# Patient Record
Sex: FEMALE | Race: WHITE | NOT HISPANIC OR LATINO
[De-identification: names, ages, dates, MRNs, and addresses within clinical notes are randomized per-mention and may not be internally consistent; named-entity substitution may affect disease eponyms.]

---

## 2017-02-20 ENCOUNTER — RX ONLY (RX ONLY)
Age: 62
End: 2017-02-20

## 2017-02-20 RX ORDER — BETAMETHASONE VALERATE 1 MG/ML
LOTION TOPICAL
Qty: 60 | Refills: 0 | Status: DISCONTINUED
Start: 2017-02-20 | End: 2017-02-22

## 2017-02-22 ENCOUNTER — RX ONLY (RX ONLY)
Age: 62
End: 2017-02-22

## 2017-02-22 RX ORDER — BETAMETHASONE VALERATE 1 MG/ML
LOTION TOPICAL
Qty: 60 | Refills: 0 | Status: DISCONTINUED
Start: 2017-02-22 | End: 2017-03-23

## 2017-03-23 ENCOUNTER — OTHER- (OUTPATIENT)
Dept: URBAN - METROPOLITAN AREA CLINIC 15 | Facility: CLINIC | Age: 62
Setting detail: DERMATOLOGY
End: 2017-03-23

## 2017-03-23 DIAGNOSIS — D22.5 MELANOCYTIC NEVI OF TRUNK: ICD-10-CM

## 2017-03-23 DIAGNOSIS — L21.8 OTHER SEBORRHEIC DERMATITIS: ICD-10-CM

## 2017-03-23 PROBLEM — D1801 228.01: Status: ACTIVE | Noted: 2017-03-23

## 2017-03-23 PROBLEM — L570 702.0: Status: ACTIVE | Noted: 2017-03-23

## 2017-03-23 PROBLEM — L930 695.4: Status: ACTIVE | Noted: 2017-03-23

## 2017-03-23 PROBLEM — L932 695.4: Status: ACTIVE | Noted: 2017-03-23

## 2017-03-23 PROCEDURE — 99213 OFFICE O/P EST LOW 20 MIN: CPT

## 2017-03-23 PROCEDURE — 17000 DESTRUCT PREMALG LESION: CPT

## 2017-03-23 RX ORDER — BETAMETHASONE VALERATE 1 MG/ML
1 APPLICATION LOTION TOPICAL AS DIRECTED
Qty: 60 | Refills: 0
Start: 2017-03-23

## 2017-10-02 ENCOUNTER — FOLLOW-UP (OUTPATIENT)
Dept: URBAN - METROPOLITAN AREA CLINIC 15 | Facility: CLINIC | Age: 62
Setting detail: DERMATOLOGY
End: 2017-10-02

## 2017-10-02 DIAGNOSIS — L91.8 OTHER HYPERTROPHIC DISORDERS OF THE SKIN: ICD-10-CM

## 2017-10-02 PROBLEM — L570 702.0: Status: ACTIVE | Noted: 2017-10-02

## 2017-10-02 PROBLEM — D1801 228.01: Status: ACTIVE | Noted: 2017-10-02

## 2017-10-02 PROBLEM — L932 695.4: Status: ACTIVE | Noted: 2017-10-02

## 2017-10-02 PROBLEM — D235 216.5: Status: ACTIVE | Noted: 2017-10-02

## 2017-10-02 PROBLEM — D485 238.2: Status: ACTIVE | Noted: 2017-10-02

## 2017-10-02 PROBLEM — L821 702.19: Status: ACTIVE | Noted: 2017-10-02

## 2017-10-02 PROBLEM — L930 695.4: Status: ACTIVE | Noted: 2017-10-02

## 2017-10-02 PROCEDURE — 99213 OFFICE O/P EST LOW 20 MIN: CPT

## 2017-10-02 PROCEDURE — 11100 BX SKIN SUBCUTANEOUS&/MUCOUS MEMBRANE 1 LESION: CPT

## 2017-10-02 PROCEDURE — 17000 DESTRUCT PREMALG LESION: CPT

## 2017-10-02 RX ORDER — BETAMETHASONE VALERATE 1 MG/ML
LOTION TOPICAL
Qty: 60 | Refills: 1 | Status: DISCONTINUED
Start: 2017-10-02 | End: 2018-02-07

## 2018-02-07 ENCOUNTER — RX ONLY (RX ONLY)
Age: 63
End: 2018-02-07

## 2018-02-07 RX ORDER — BETAMETHASONE VALERATE 1 MG/ML
1 APPLICATION LOTION TOPICAL BID
Qty: 60 | Refills: 1 | Status: DISCONTINUED
Start: 2018-02-07 | End: 2018-04-30

## 2018-04-30 ENCOUNTER — FOLLOW-UP (OUTPATIENT)
Dept: URBAN - METROPOLITAN AREA CLINIC 15 | Facility: CLINIC | Age: 63
Setting detail: DERMATOLOGY
End: 2018-04-30

## 2018-04-30 DIAGNOSIS — L72.8 OTHER FOLLICULAR CYSTS OF THE SKIN AND SUBCUTANEOUS TISSUE: ICD-10-CM

## 2018-04-30 PROBLEM — D235 216.5: Status: ACTIVE | Noted: 2018-04-30

## 2018-04-30 PROCEDURE — 99213 OFFICE O/P EST LOW 20 MIN: CPT

## 2018-04-30 RX ORDER — BETAMETHASONE VALERATE 1 MG/ML
1 APPLICATION LOTION TOPICAL BID
Qty: 60 | Refills: 1 | Status: DISCONTINUED
Start: 2018-04-30 | End: 2018-12-11

## 2018-08-22 RX ORDER — FLUOCINOLONE ACETONIDE 0.11 MG/ML
1 ML OIL TOPICAL DAILY
Qty: 118.28 | Refills: 3
Start: 2018-08-22

## 2018-08-22 RX ORDER — KETOCONAZOLE 20 MG/ML
1 APPLICATION SHAMPOO, SUSPENSION TOPICAL
Qty: 120 | Refills: 2 | Status: DISCONTINUED
Start: 2018-08-22 | End: 2019-09-25

## 2018-12-11 ENCOUNTER — RX ONLY (RX ONLY)
Age: 63
End: 2018-12-11

## 2018-12-11 RX ORDER — BETAMETHASONE VALERATE 1 MG/ML
1 APPLICATION LOTION TOPICAL BID
Qty: 60 | Refills: 1 | Status: DISCONTINUED
Start: 2018-12-11 | End: 2019-01-28

## 2019-01-28 ENCOUNTER — RX ONLY (RX ONLY)
Age: 64
End: 2019-01-28

## 2019-01-28 RX ORDER — BETAMETHASONE VALERATE 1 MG/ML
1 APPLICATION LOTION TOPICAL BID
Qty: 60 | Refills: 1
Start: 2019-01-28

## 2019-05-07 ENCOUNTER — SKIN CHECK (OUTPATIENT)
Dept: URBAN - METROPOLITAN AREA CLINIC 15 | Facility: CLINIC | Age: 64
Setting detail: DERMATOLOGY
End: 2019-05-07

## 2019-05-07 DIAGNOSIS — L30.8 OTHER SPECIFIED DERMATITIS: ICD-10-CM

## 2019-05-07 PROBLEM — L932 695.4: Status: ACTIVE | Noted: 2019-05-07

## 2019-05-07 PROBLEM — D235 216.5: Status: ACTIVE | Noted: 2019-05-07

## 2019-05-07 PROBLEM — L821 702.19: Status: ACTIVE | Noted: 2019-05-07

## 2019-05-07 PROBLEM — L930 695.4: Status: ACTIVE | Noted: 2019-05-07

## 2019-05-07 PROBLEM — D1801 228.01: Status: ACTIVE | Noted: 2019-05-07

## 2019-05-07 PROCEDURE — 11103 TANGNTL BX SKIN EA SEP/ADDL: CPT

## 2019-05-07 PROCEDURE — 11102 TANGNTL BX SKIN SINGLE LES: CPT

## 2019-05-07 PROCEDURE — 99213 OFFICE O/P EST LOW 20 MIN: CPT

## 2019-05-07 RX ORDER — MOMETASONE FUROATE 1 MG/ML
1 ML SOLUTION TOPICAL BID
Qty: 60 | Refills: 2
Start: 2019-05-07

## 2019-06-06 ENCOUNTER — OTHER- (OUTPATIENT)
Dept: URBAN - METROPOLITAN AREA CLINIC 15 | Facility: CLINIC | Age: 64
Setting detail: DERMATOLOGY
End: 2019-06-06

## 2019-06-06 DIAGNOSIS — L70.0 ACNE VULGARIS: ICD-10-CM

## 2019-06-06 PROBLEM — L570 702.0: Status: ACTIVE | Noted: 2019-06-06

## 2019-06-06 PROCEDURE — 17000 DESTRUCT PREMALG LESION: CPT

## 2020-03-12 ENCOUNTER — RX ONLY (RX ONLY)
Age: 65
End: 2020-03-12

## 2020-03-12 RX ORDER — MOMETASONE FUROATE 1 MG/ML
1 ML SOLUTION TOPICAL BID
Qty: 60 | Refills: 0
Start: 2020-03-12

## 2020-05-26 ENCOUNTER — RX ONLY (RX ONLY)
Age: 65
End: 2020-05-26

## 2020-05-26 ENCOUNTER — SKIN CHECK (OUTPATIENT)
Dept: URBAN - METROPOLITAN AREA CLINIC 15 | Facility: CLINIC | Age: 65
Setting detail: DERMATOLOGY
End: 2020-05-26

## 2020-05-26 DIAGNOSIS — L91.8 OTHER HYPERTROPHIC DISORDERS OF THE SKIN: ICD-10-CM

## 2020-05-26 PROCEDURE — 17000 DESTRUCT PREMALG LESION: CPT

## 2020-05-26 PROCEDURE — 99214 OFFICE O/P EST MOD 30 MIN: CPT

## 2020-05-26 PROCEDURE — 17003 DESTRUCT PREMALG LES 2-14: CPT

## 2020-05-26 RX ORDER — MOMETASONE FUROATE 1 MG/ML
1 ML SOLUTION TOPICAL BID
Qty: 60 | Refills: 0
Start: 2020-05-26

## 2020-05-26 RX ORDER — BETAMETHASONE VALERATE 1 MG/ML
1 APPLICATION LOTION TOPICAL BID
Qty: 60 | Refills: 5
Start: 2020-05-26

## 2020-05-26 RX ORDER — HYDROCORTISONE 25 MG/G
1 APPLICATION OINTMENT TOPICAL AS DIRECTED
Qty: 28.35 | Refills: 0
Start: 2020-05-26

## 2020-09-09 ENCOUNTER — RX ONLY (RX ONLY)
Age: 65
End: 2020-09-09

## 2020-09-09 RX ORDER — MOMETASONE FUROATE 1 MG/ML
1 ML SOLUTION TOPICAL BID
Qty: 60 | Refills: 0
Start: 2020-09-09

## 2020-11-12 ENCOUNTER — RX ONLY (RX ONLY)
Age: 65
End: 2020-11-12

## 2020-11-12 RX ORDER — MOMETASONE FUROATE 1 MG/ML
1 ML SOLUTION TOPICAL TWICE A DAY
Qty: 60 | Refills: 1
Start: 2020-11-12

## 2021-02-22 ENCOUNTER — RX ONLY (RX ONLY)
Age: 66
End: 2021-02-22

## 2021-02-22 RX ORDER — MOMETASONE FUROATE 1 MG/ML
1 A SMALL AMOUNT SOLUTION TOPICAL AT BEDTIME
Qty: 60 | Refills: 2
Start: 2021-02-22

## 2021-05-24 ENCOUNTER — COMPLETE SKIN EXAM (OUTPATIENT)
Dept: URBAN - METROPOLITAN AREA CLINIC 15 | Facility: CLINIC | Age: 66
Setting detail: DERMATOLOGY
End: 2021-05-24

## 2021-05-24 DIAGNOSIS — L82.0 INFLAMED SEBORRHEIC KERATOSIS: ICD-10-CM

## 2021-05-24 PROCEDURE — 17000 DESTRUCT PREMALG LESION: CPT

## 2021-05-24 PROCEDURE — 17003 DESTRUCT PREMALG LES 2-14: CPT

## 2021-05-24 PROCEDURE — 99213 OFFICE O/P EST LOW 20 MIN: CPT

## 2021-05-24 PROCEDURE — 11102 TANGNTL BX SKIN SINGLE LES: CPT

## 2021-05-24 RX ORDER — BETAMETHASONE VALERATE 1 MG/ML
1 AS DIRECTED LOTION TOPICAL TWICE A DAY
Qty: 60 | Refills: 6
Start: 2021-05-24

## 2021-07-01 ENCOUNTER — RX ONLY (RX ONLY)
Age: 66
End: 2021-07-01

## 2021-07-01 RX ORDER — MOMETASONE FUROATE 1 MG/ML
1 AS DIRECTED SOLUTION TOPICAL TWICE A DAY
Qty: 60 | Refills: 2
Start: 2021-07-01

## 2021-11-24 ENCOUNTER — RX ONLY (RX ONLY)
Age: 66
End: 2021-11-24

## 2021-11-24 RX ORDER — MOMETASONE FUROATE 1 MG/ML
1 AS DIRECTED SOLUTION TOPICAL TWICE A DAY
Qty: 60 | Refills: 3
Start: 2021-11-24

## 2022-06-01 ENCOUNTER — RX ONLY (RX ONLY)
Age: 67
End: 2022-06-01

## 2022-06-01 ENCOUNTER — COMPLETE SKIN EXAM (OUTPATIENT)
Dept: URBAN - METROPOLITAN AREA CLINIC 8 | Facility: CLINIC | Age: 67
Setting detail: DERMATOLOGY
End: 2022-06-01

## 2022-06-01 DIAGNOSIS — L23.9 ALLERGIC CONTACT DERMATITIS, UNSPECIFIED CAUSE: ICD-10-CM

## 2022-06-01 DIAGNOSIS — Z01.82 ENCOUNTER FOR ALLERGY TESTING: ICD-10-CM

## 2022-06-01 PROCEDURE — 99213 OFFICE O/P EST LOW 20 MIN: CPT

## 2022-06-01 RX ORDER — BETAMETHASONE VALERATE 1 MG/ML
1 AS DIRECTED LOTION TOPICAL TWICE A DAY
Qty: 60 | Refills: 6
Start: 2022-06-01

## 2022-06-23 ENCOUNTER — RX ONLY (RX ONLY)
Age: 67
End: 2022-06-23

## 2022-06-23 RX ORDER — MOMETASONE FUROATE 1 MG/ML
1 AS DIRECTED SOLUTION TOPICAL TWICE A DAY
Qty: 60 | Refills: 1
Start: 2022-06-23

## 2023-01-12 ENCOUNTER — RX ONLY (RX ONLY)
Age: 68
End: 2023-01-12

## 2023-01-12 RX ORDER — MOMETASONE FUROATE 1 MG/ML
SOLUTION TOPICAL
Qty: 60 | Refills: 3 | Status: ERX

## 2023-06-05 ENCOUNTER — APPOINTMENT (OUTPATIENT)
Dept: URBAN - METROPOLITAN AREA CLINIC 215 | Age: 68
Setting detail: DERMATOLOGY
End: 2023-06-05

## 2023-06-05 DIAGNOSIS — L81.4 OTHER MELANIN HYPERPIGMENTATION: ICD-10-CM

## 2023-06-05 DIAGNOSIS — L82.1 OTHER SEBORRHEIC KERATOSIS: ICD-10-CM

## 2023-06-05 DIAGNOSIS — D18.0 HEMANGIOMA: ICD-10-CM

## 2023-06-05 DIAGNOSIS — L93.0 DISCOID LUPUS ERYTHEMATOSUS: ICD-10-CM

## 2023-06-05 DIAGNOSIS — D22 MELANOCYTIC NEVI: ICD-10-CM

## 2023-06-05 PROBLEM — D18.01 HEMANGIOMA OF SKIN AND SUBCUTANEOUS TISSUE: Status: ACTIVE | Noted: 2023-06-05

## 2023-06-05 PROBLEM — D22.5 MELANOCYTIC NEVI OF TRUNK: Status: ACTIVE | Noted: 2023-06-05

## 2023-06-05 PROCEDURE — OTHER PRESCRIPTION MEDICATION MANAGEMENT: OTHER

## 2023-06-05 PROCEDURE — OTHER PRESCRIPTION: OTHER

## 2023-06-05 PROCEDURE — 99213 OFFICE O/P EST LOW 20 MIN: CPT

## 2023-06-05 PROCEDURE — OTHER COUNSELING: OTHER

## 2023-06-05 RX ORDER — MOMETASONE FUROATE 1 MG/ML
SOLUTION TOPICAL
Qty: 60 | Refills: 4 | Status: ERX | COMMUNITY
Start: 2023-06-05

## 2023-06-05 ASSESSMENT — LOCATION DETAILED DESCRIPTION DERM
LOCATION DETAILED: LEFT SUPERIOR OCCIPITAL SCALP
LOCATION DETAILED: RIGHT MEDIAL INFERIOR CHEST
LOCATION DETAILED: RIGHT MEDIAL SUPERIOR CHEST
LOCATION DETAILED: LEFT MEDIAL SUPERIOR CHEST

## 2023-06-05 ASSESSMENT — LOCATION ZONE DERM
LOCATION ZONE: TRUNK
LOCATION ZONE: SCALP

## 2023-06-05 ASSESSMENT — LOCATION SIMPLE DESCRIPTION DERM
LOCATION SIMPLE: LEFT OCCIPITAL SCALP
LOCATION SIMPLE: CHEST

## 2023-06-05 NOTE — PROCEDURE: COUNSELING
Detail Level: Zone
Patient Specific Counseling (Will Not Stick From Patient To Patient): Clinically appears quiescent, and her biopsy was several years ago. Disease may be burned out. Try tapering topical steroid use.
Detail Level: Detailed

## 2023-06-05 NOTE — PROCEDURE: PRESCRIPTION MEDICATION MANAGEMENT
Detail Level: Zone
Render In Strict Bullet Format?: No
Modify Regimen: Taper Mometasone 0.1% solution down to 2-3 times a week prn

## 2024-06-05 ENCOUNTER — APPOINTMENT (OUTPATIENT)
Dept: URBAN - METROPOLITAN AREA CLINIC 215 | Age: 69
Setting detail: DERMATOLOGY
End: 2024-06-05

## 2024-06-05 DIAGNOSIS — L81.4 OTHER MELANIN HYPERPIGMENTATION: ICD-10-CM

## 2024-06-05 DIAGNOSIS — L82.1 OTHER SEBORRHEIC KERATOSIS: ICD-10-CM

## 2024-06-05 DIAGNOSIS — D22 MELANOCYTIC NEVI: ICD-10-CM

## 2024-06-05 DIAGNOSIS — L93.0 DISCOID LUPUS ERYTHEMATOSUS: ICD-10-CM

## 2024-06-05 DIAGNOSIS — D18.0 HEMANGIOMA: ICD-10-CM

## 2024-06-05 PROBLEM — D22.5 MELANOCYTIC NEVI OF TRUNK: Status: ACTIVE | Noted: 2024-06-05

## 2024-06-05 PROBLEM — D18.01 HEMANGIOMA OF SKIN AND SUBCUTANEOUS TISSUE: Status: ACTIVE | Noted: 2024-06-05

## 2024-06-05 PROCEDURE — OTHER PRESCRIPTION MEDICATION MANAGEMENT: OTHER

## 2024-06-05 PROCEDURE — OTHER COUNSELING: OTHER

## 2024-06-05 PROCEDURE — 99213 OFFICE O/P EST LOW 20 MIN: CPT

## 2024-06-05 ASSESSMENT — LOCATION ZONE DERM
LOCATION ZONE: TRUNK
LOCATION ZONE: SCALP

## 2024-06-05 ASSESSMENT — LOCATION DETAILED DESCRIPTION DERM
LOCATION DETAILED: RIGHT MEDIAL INFERIOR CHEST
LOCATION DETAILED: RIGHT MEDIAL SUPERIOR CHEST
LOCATION DETAILED: LEFT MEDIAL SUPERIOR CHEST
LOCATION DETAILED: MID-FRONTAL SCALP

## 2024-06-05 ASSESSMENT — LOCATION SIMPLE DESCRIPTION DERM
LOCATION SIMPLE: CHEST
LOCATION SIMPLE: ANTERIOR SCALP

## 2024-06-05 NOTE — PROCEDURE: PRESCRIPTION MEDICATION MANAGEMENT
Continue Regimen: mometasone 0.1 % topical solution to scalp two to three times a week
Render In Strict Bullet Format?: No
Detail Level: Detailed

## 2024-06-05 NOTE — PROCEDURE: COUNSELING
Detail Level: Generalized
Detail Level: Detailed
Detail Level: Zone
Patient Specific Counseling (Will Not Stick From Patient To Patient): Disease appears inactive, taper topical steroid frequency.

## 2025-06-11 ENCOUNTER — APPOINTMENT (OUTPATIENT)
Dept: URBAN - METROPOLITAN AREA CLINIC 215 | Age: 70
Setting detail: DERMATOLOGY
End: 2025-06-11

## 2025-06-11 DIAGNOSIS — D22 MELANOCYTIC NEVI: ICD-10-CM

## 2025-06-11 DIAGNOSIS — D18.0 HEMANGIOMA: ICD-10-CM

## 2025-06-11 DIAGNOSIS — L82.1 OTHER SEBORRHEIC KERATOSIS: ICD-10-CM

## 2025-06-11 DIAGNOSIS — L81.4 OTHER MELANIN HYPERPIGMENTATION: ICD-10-CM

## 2025-06-11 DIAGNOSIS — L93.0 DISCOID LUPUS ERYTHEMATOSUS: ICD-10-CM

## 2025-06-11 PROBLEM — D22.5 MELANOCYTIC NEVI OF TRUNK: Status: ACTIVE | Noted: 2025-06-11

## 2025-06-11 PROBLEM — D18.01 HEMANGIOMA OF SKIN AND SUBCUTANEOUS TISSUE: Status: ACTIVE | Noted: 2025-06-11

## 2025-06-11 PROCEDURE — 99213 OFFICE O/P EST LOW 20 MIN: CPT

## 2025-06-11 PROCEDURE — OTHER COUNSELING: OTHER

## 2025-06-11 PROCEDURE — OTHER PRESCRIPTION: OTHER

## 2025-06-11 RX ORDER — MOMETASONE FUROATE 1 MG/ML
SOLUTION TOPICAL
Qty: 30 | Refills: 4 | Status: ERX

## 2025-06-11 ASSESSMENT — LOCATION ZONE DERM
LOCATION ZONE: SCALP
LOCATION ZONE: TRUNK

## 2025-06-11 ASSESSMENT — LOCATION DETAILED DESCRIPTION DERM
LOCATION DETAILED: RIGHT MEDIAL INFERIOR CHEST
LOCATION DETAILED: LEFT SUPERIOR OCCIPITAL SCALP
LOCATION DETAILED: LEFT MEDIAL SUPERIOR CHEST
LOCATION DETAILED: RIGHT MEDIAL SUPERIOR CHEST

## 2025-06-11 ASSESSMENT — LOCATION SIMPLE DESCRIPTION DERM
LOCATION SIMPLE: CHEST
LOCATION SIMPLE: LEFT OCCIPITAL SCALP